# Patient Record
Sex: MALE | Race: WHITE | Employment: FULL TIME | URBAN - METROPOLITAN AREA
[De-identification: names, ages, dates, MRNs, and addresses within clinical notes are randomized per-mention and may not be internally consistent; named-entity substitution may affect disease eponyms.]

---

## 2018-09-12 NOTE — H&P
[address] [facility] [address] Gastroenterology                                                                                                  Clinic History and Physical     Pa History: Social History    Tobacco Use      Smoking status: Never Smoker      Smokeless tobacco: Never Used    Alcohol use: Yes    Drug use: No       Medications (Active prior to today's visit):    Current Outpatient Medications:  ALPRAZolam 0.5 MG Oral Ta are palpated  : no CVA tenderness  Skin: dry, warm, no jaundice  Ext: no cyanosis, clubbing or edema is evident.    Neuro: Alert and oriented x4, and patient is having movements of all 4 extremities   Psych: Pt has a normal mood and affect, behavior is no indicated. Orders This Visit:  No orders of the defined types were placed in this encounter.       Meds This Visit:  Requested Prescriptions     Signed Prescriptions Disp Refills   • PEG 3350-KCl-NaBcb-NaCl-NaSulf (COLYTE WITH FLAVOR PACKS) 240 g Oral

## 2018-09-19 ENCOUNTER — OFFICE VISIT (OUTPATIENT)
Dept: GASTROENTEROLOGY | Facility: CLINIC | Age: 53
End: 2018-09-19
Payer: COMMERCIAL

## 2018-09-19 ENCOUNTER — TELEPHONE (OUTPATIENT)
Dept: GASTROENTEROLOGY | Facility: CLINIC | Age: 53
End: 2018-09-19

## 2018-09-19 VITALS
WEIGHT: 219 LBS | SYSTOLIC BLOOD PRESSURE: 122 MMHG | BODY MASS INDEX: 26.95 KG/M2 | HEIGHT: 75.5 IN | DIASTOLIC BLOOD PRESSURE: 72 MMHG | HEART RATE: 89 BPM

## 2018-09-19 DIAGNOSIS — Z86.010 HX OF COLONIC POLYPS: Primary | ICD-10-CM

## 2018-09-19 DIAGNOSIS — Z86.010 HISTORY OF COLON POLYPS: Primary | ICD-10-CM

## 2018-09-19 DIAGNOSIS — Z86.010 HISTORY OF COLONIC POLYPS: Primary | ICD-10-CM

## 2018-09-19 PROCEDURE — 99243 OFF/OP CNSLTJ NEW/EST LOW 30: CPT | Performed by: NURSE PRACTITIONER

## 2018-09-19 PROCEDURE — 99212 OFFICE O/P EST SF 10 MIN: CPT | Performed by: NURSE PRACTITIONER

## 2018-09-19 RX ORDER — DEXTROAMPHETAMINE SACCHARATE, AMPHETAMINE ASPARTATE MONOHYDRATE, DEXTROAMPHETAMINE SULFATE AND AMPHETAMINE SULFATE 5; 5; 5; 5 MG/1; MG/1; MG/1; MG/1
20 CAPSULE, EXTENDED RELEASE ORAL
Refills: 0 | Status: ON HOLD | COMMUNITY
Start: 2018-07-06 | End: 2018-11-13

## 2018-09-19 RX ORDER — DEXTROAMPHETAMINE SACCHARATE, AMPHETAMINE ASPARTATE, DEXTROAMPHETAMINE SULFATE AND AMPHETAMINE SULFATE 2.5; 2.5; 2.5; 2.5 MG/1; MG/1; MG/1; MG/1
TABLET ORAL
Refills: 0 | COMMUNITY
Start: 2018-07-06

## 2018-09-19 RX ORDER — NICOTINE 14MG/24HR
PATCH, TRANSDERMAL 24 HOURS TRANSDERMAL
COMMUNITY

## 2018-09-19 RX ORDER — LISDEXAMFETAMINE DIMESYLATE 40 MG/1
40 CAPSULE ORAL
Refills: 0 | COMMUNITY
Start: 2018-08-31

## 2018-09-19 RX ORDER — ALPRAZOLAM 0.5 MG/1
TABLET, ORALLY DISINTEGRATING ORAL
Refills: 1 | COMMUNITY
Start: 2018-07-05

## 2018-09-19 NOTE — TELEPHONE ENCOUNTER
Spoke to pt and pt still wants to cancel procedure for  11/6/18    I canceled in [facility], sent a Surgical Case Change Request, forwarded to GI Schedulers.     HENOK Cha

## 2018-09-19 NOTE — TELEPHONE ENCOUNTER
Scheduled for:  Colonoscopy [id number]  Provider Name: Dr. Torres Bring  Date:  11/6/18  Location:  [facility]  Sedation:  IV  Time:  1756 (pt is aware to arrive at 1330)   Prep:  Colyte  Meds/Allergies Reconciled?:  Jennifer/APN reviewed   Diagnosis with codes:  History of colon

## 2018-09-19 NOTE — TELEPHONE ENCOUNTER
Scheduled for:  Colonoscopy - [id number]  Provider Name:  Dr. Shaila Nix  Date:  11/13/18  Location:  [facility]  Sedation:  IV  Time:  1:00 pm (pt is aware to arrive at 12:00 pm)  Prep:  Colyte, Prep instructions were given to pt over the phone, pt verbalized understanding.

## 2018-09-19 NOTE — PATIENT INSTRUCTIONS
1. Schedule colonoscopy with Dr. Shawanda Stratton w/ IV Twilight or MAC     2.  bowel prep from pharmacy - split dose Colyte    3. Continue all medications for procedure    4. Read all bowel prep instructions carefully    5.  AVOID seeds, nuts, popcorn,

## 2018-11-12 ENCOUNTER — TELEPHONE (OUTPATIENT)
Dept: GASTROENTEROLOGY | Facility: CLINIC | Age: 53
End: 2018-11-12

## 2018-11-12 NOTE — TELEPHONE ENCOUNTER
Spoke to patient went over bowel prep instructions : Your appointment is on: 11/13/18    You are scheduled to have your test done at :        [facility]; [facility]. [address] [place] [address]. [place] in [address] [place] [address] [employer] [facility]. Go to the 2nd floor endoscopy. TRANSPORTATION:  Make arrangements to have someone drive you home after the procedure. You cannot use public transportation, taxi cab, Uber/Lyft unless you have a friend/family member/caretaker accompanying you.   Colonoscopy involves sedation, and colonoscopy. You CANNOT have anything to drink 3 hours prior to the procedure. Your stomach needs to be empty.

## 2018-11-12 NOTE — TELEPHONE ENCOUNTER
Pt has CLN [facility] for tomorrow 11/13 and has lost his prep instructions, pls call at:[phone],thanks.

## 2018-11-13 ENCOUNTER — HOSPITAL ENCOUNTER (OUTPATIENT)
Facility: HOSPITAL | Age: 53
Setting detail: HOSPITAL OUTPATIENT SURGERY
Discharge: HOME | End: 2018-11-13
Attending: INTERNAL MEDICINE | Admitting: INTERNAL MEDICINE
Payer: COMMERCIAL

## 2018-11-13 VITALS
WEIGHT: 215 LBS | HEIGHT: 75 IN | OXYGEN SATURATION: 100 % | DIASTOLIC BLOOD PRESSURE: 77 MMHG | SYSTOLIC BLOOD PRESSURE: 121 MMHG | BODY MASS INDEX: 26.73 KG/M2 | HEART RATE: 75 BPM | RESPIRATION RATE: 14 BRPM

## 2018-11-13 DIAGNOSIS — Z86.010 HX OF COLONIC POLYPS: ICD-10-CM

## 2018-11-13 PROCEDURE — 45385 COLONOSCOPY W/LESION REMOVAL: CPT | Performed by: INTERNAL MEDICINE

## 2018-11-13 PROCEDURE — 0DBL8ZX EXCISION OF TRANSVERSE COLON, VIA NATURAL OR ARTIFICIAL OPENING ENDOSCOPIC, DIAGNOSTIC: ICD-10-PCS | Performed by: INTERNAL MEDICINE

## 2018-11-13 PROCEDURE — G0500 MOD SEDAT ENDO SERVICE >5YRS: HCPCS | Performed by: INTERNAL MEDICINE

## 2018-11-13 PROCEDURE — 0DBP8ZX EXCISION OF RECTUM, VIA NATURAL OR ARTIFICIAL OPENING ENDOSCOPIC, DIAGNOSTIC: ICD-10-PCS | Performed by: INTERNAL MEDICINE

## 2018-11-13 PROCEDURE — 0DBK8ZX EXCISION OF ASCENDING COLON, VIA NATURAL OR ARTIFICIAL OPENING ENDOSCOPIC, DIAGNOSTIC: ICD-10-PCS | Performed by: INTERNAL MEDICINE

## 2018-11-13 RX ORDER — SODIUM CHLORIDE, SODIUM LACTATE, POTASSIUM CHLORIDE, CALCIUM CHLORIDE 600; 310; 30; 20 MG/100ML; MG/100ML; MG/100ML; MG/100ML
INJECTION, SOLUTION INTRAVENOUS CONTINUOUS
Status: DISCONTINUED | OUTPATIENT
Start: 2018-11-13 | End: 2018-11-13

## 2018-11-13 RX ORDER — SODIUM CHLORIDE 0.9 % (FLUSH) 0.9 %
10 SYRINGE (ML) INJECTION AS NEEDED
Status: DISCONTINUED | OUTPATIENT
Start: 2018-11-13 | End: 2018-11-13

## 2018-11-13 RX ORDER — MIDAZOLAM HYDROCHLORIDE 1 MG/ML
1 INJECTION INTRAMUSCULAR; INTRAVENOUS EVERY 5 MIN PRN
Status: DISCONTINUED | OUTPATIENT
Start: 2018-11-13 | End: 2018-11-13

## 2018-11-13 RX ORDER — MIDAZOLAM HYDROCHLORIDE 1 MG/ML
INJECTION INTRAMUSCULAR; INTRAVENOUS
Status: DISCONTINUED | OUTPATIENT
Start: 2018-11-13 | End: 2018-11-13

## 2018-11-13 NOTE — OPERATIVE REPORT
[facility] Endoscopy Report      Date of Procedure:  11/13/18      Preoperative Diagnosis:  1. Personal history of adenomatous colon polyp  2. Colorectal cancer screening        Postoperative Diagnosis:  1. Colon polyps  2.   Minimal dive excised and retrieved via suction. The site was closed with a solitary endoscopic clip. Inspection of all the above sites revealed no evidence of ongoing bleeding.   There was a tiny diverticulum seen in the proximal transverse colon without signs of co

## 2018-11-13 NOTE — H&P
History & Physical Examination    Patient Name: Dwayne Peng  MRN: [id number]  [facility]: [id number]  YOB: 1965    Diagnosis: Personal history of adenomatous colon polyp, colorectal cancer screening        No medications prior to admission.

## 2018-11-16 ENCOUNTER — TELEPHONE (OUTPATIENT)
Dept: GASTROENTEROLOGY | Facility: CLINIC | Age: 53
End: 2018-11-16

## 2018-11-16 NOTE — TELEPHONE ENCOUNTER
----- Message from Theresa Cruz MD sent at 11/15/2018  7:44 PM CST -----  Up to [place]. He is feeling well. He had #5 subcentimeter polyps removed. #3 were adenomatous and the other 2 were not. I have discussed the significance.   Minimal diverticu

## 2021-03-10 ENCOUNTER — HOSPITAL ENCOUNTER (OUTPATIENT)
Dept: GENERAL RADIOLOGY | Age: 56
Discharge: HOME | End: 2021-03-10
Attending: FAMILY MEDICINE
Payer: COMMERCIAL

## 2021-03-10 DIAGNOSIS — R05.9 COUGH: ICD-10-CM

## 2021-03-10 PROCEDURE — 71046 X-RAY EXAM CHEST 2 VIEWS: CPT | Performed by: FAMILY MEDICINE

## 2021-04-08 ENCOUNTER — HOSPITAL ENCOUNTER (OUTPATIENT)
Dept: GENERAL RADIOLOGY | Facility: HOSPITAL | Age: 56
Discharge: HOME | End: 2021-04-08
Attending: SPECIALIST
Payer: COMMERCIAL

## 2021-04-08 DIAGNOSIS — R06.1 EXPIRATORY STRIDOR: ICD-10-CM

## 2021-04-08 PROCEDURE — 70360 X-RAY EXAM OF NECK: CPT | Performed by: SPECIALIST

## 2021-04-12 ENCOUNTER — LAB ENCOUNTER (OUTPATIENT)
Dept: LAB | Age: 56
End: 2021-04-12
Attending: SPECIALIST
Payer: COMMERCIAL

## 2021-04-12 ENCOUNTER — ORDER TRANSCRIPTION (OUTPATIENT)
Dept: ADMINISTRATIVE | Facility: HOSPITAL | Age: 56
End: 2021-04-12

## 2021-04-12 DIAGNOSIS — Z11.59 ENCOUNTER FOR SCREENING FOR OTHER VIRAL DISEASES: ICD-10-CM

## 2021-04-12 DIAGNOSIS — Z01.818 PREPROCEDURAL EXAMINATION: ICD-10-CM

## 2021-04-12 DIAGNOSIS — R06.1 STRIDOR: Primary | ICD-10-CM

## 2021-04-17 ENCOUNTER — LAB ENCOUNTER (OUTPATIENT)
Dept: LAB | Age: 56
End: 2021-04-17
Attending: SPECIALIST
Payer: COMMERCIAL

## 2021-04-17 DIAGNOSIS — Z11.59 ENCOUNTER FOR SCREENING FOR OTHER VIRAL DISEASES: ICD-10-CM

## 2021-04-17 DIAGNOSIS — Z01.818 PREOP TESTING: ICD-10-CM

## 2021-04-20 ENCOUNTER — HOSPITAL ENCOUNTER (OUTPATIENT)
Dept: RESPIRATORY THERAPY | Facility: HOSPITAL | Age: 56
Discharge: HOME | End: 2021-04-20
Attending: SPECIALIST
Payer: COMMERCIAL

## 2021-04-20 ENCOUNTER — TELEPHONE (OUTPATIENT)
Dept: RESPIRATORY THERAPY | Facility: HOSPITAL | Age: 56
End: 2021-04-20

## 2021-04-20 DIAGNOSIS — R06.1 STRIDOR: ICD-10-CM

## 2021-04-20 PROCEDURE — 94729 DIFFUSING CAPACITY: CPT

## 2021-04-20 PROCEDURE — 94726 PLETHYSMOGRAPHY LUNG VOLUMES: CPT

## 2021-04-20 PROCEDURE — 94060 EVALUATION OF WHEEZING: CPT

## 2021-08-26 ENCOUNTER — LAB ENCOUNTER (OUTPATIENT)
Dept: LAB | Age: 56
End: 2021-08-26
Attending: INTERNAL MEDICINE
Payer: COMMERCIAL

## 2021-08-26 DIAGNOSIS — R06.02 SOB (SHORTNESS OF BREATH): ICD-10-CM

## 2021-08-26 DIAGNOSIS — R05.9 COUGH: Primary | ICD-10-CM

## 2021-08-26 LAB — D DIMER PPP FEU-MCNC: 1.91 UG/ML FEU (ref ?–0.58)

## 2021-08-26 PROCEDURE — 85379 FIBRIN DEGRADATION QUANT: CPT

## 2021-08-26 PROCEDURE — 36415 COLL VENOUS BLD VENIPUNCTURE: CPT

## 2021-08-30 ENCOUNTER — HOSPITAL ENCOUNTER (OUTPATIENT)
Dept: CT IMAGING | Facility: HOSPITAL | Age: 56
Discharge: HOME | End: 2021-08-30
Attending: INTERNAL MEDICINE
Payer: COMMERCIAL

## 2021-08-30 DIAGNOSIS — R89.9 ELEVATED LABORATORY TEST RESULT: ICD-10-CM

## 2021-08-30 LAB — CREAT BLD-MCNC: 1 MG/DL

## 2021-08-30 PROCEDURE — 82565 ASSAY OF CREATININE: CPT

## 2021-08-30 PROCEDURE — 71260 CT THORAX DX C+: CPT | Performed by: INTERNAL MEDICINE

## 2022-09-04 ENCOUNTER — APPOINTMENT (OUTPATIENT)
Dept: GENERAL RADIOLOGY | Facility: HOSPITAL | Age: 57
End: 2022-09-04
Payer: COMMERCIAL

## 2022-09-04 ENCOUNTER — HOSPITAL ENCOUNTER (EMERGENCY)
Facility: HOSPITAL | Age: 57
Discharge: HOME | End: 2022-09-04
Payer: COMMERCIAL

## 2022-09-04 VITALS
RESPIRATION RATE: 20 BRPM | OXYGEN SATURATION: 100 % | DIASTOLIC BLOOD PRESSURE: 66 MMHG | SYSTOLIC BLOOD PRESSURE: 119 MMHG | HEART RATE: 90 BPM | WEIGHT: 215 LBS | BODY MASS INDEX: 26.73 KG/M2 | HEIGHT: 75 IN | TEMPERATURE: 98 F

## 2022-09-04 DIAGNOSIS — S09.8XXA BLUNT HEAD INJURY, INITIAL ENCOUNTER: ICD-10-CM

## 2022-09-04 DIAGNOSIS — S01.81XA FOREHEAD LACERATION, INITIAL ENCOUNTER: ICD-10-CM

## 2022-09-04 DIAGNOSIS — S46.912A LEFT SHOULDER STRAIN, INITIAL ENCOUNTER: Primary | ICD-10-CM

## 2022-09-04 PROCEDURE — 73030 X-RAY EXAM OF SHOULDER: CPT

## 2022-09-04 PROCEDURE — 12011 RPR F/E/E/N/L/M 2.5 CM/<: CPT

## 2022-09-04 PROCEDURE — 90471 IMMUNIZATION ADMIN: CPT

## 2022-09-04 PROCEDURE — 99283 EMERGENCY DEPT VISIT LOW MDM: CPT

## 2022-09-04 RX ORDER — HYDROCODONE BITARTRATE AND ACETAMINOPHEN 5; 325 MG/1; MG/1
1-2 TABLET ORAL EVERY 6 HOURS PRN
Qty: 16 TABLET | Refills: 0 | Status: SHIPPED | OUTPATIENT
Start: 2022-09-04

## 2022-09-04 RX ORDER — HYDROCODONE BITARTRATE AND ACETAMINOPHEN 5; 325 MG/1; MG/1
2 TABLET ORAL ONCE
Status: COMPLETED | OUTPATIENT
Start: 2022-09-04 | End: 2022-09-04

## 2022-09-04 RX ORDER — HYDROCODONE BITARTRATE AND ACETAMINOPHEN 5; 325 MG/1; MG/1
1-2 TABLET ORAL EVERY 6 HOURS PRN
Qty: 16 TABLET | Refills: 0 | Status: SHIPPED | OUTPATIENT
Start: 2022-09-04 | End: 2022-09-04
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
## 2022-10-10 ENCOUNTER — ORDER TRANSCRIPTION (OUTPATIENT)
Dept: PHYSICAL THERAPY | Facility: HOSPITAL | Age: 57
End: 2022-10-10

## 2022-10-10 DIAGNOSIS — M19.011 ARTHRITIS OF RIGHT SHOULDER REGION: Primary | ICD-10-CM

## 2022-10-10 DIAGNOSIS — S43.402D SPRAIN OF LEFT SHOULDER, SUBSEQUENT ENCOUNTER: ICD-10-CM

## 2022-10-17 ENCOUNTER — TELEPHONE (OUTPATIENT)
Dept: PHYSICAL THERAPY | Facility: HOSPITAL | Age: 57
End: 2022-10-17

## 2022-10-19 ENCOUNTER — OFFICE VISIT (OUTPATIENT)
Dept: PHYSICAL THERAPY | Age: 57
End: 2022-10-19
Attending: ORTHOPAEDIC SURGERY
Payer: COMMERCIAL

## 2022-10-19 DIAGNOSIS — M19.011 ARTHRITIS OF RIGHT SHOULDER REGION: ICD-10-CM

## 2022-10-19 DIAGNOSIS — S43.402D SPRAIN OF LEFT SHOULDER, SUBSEQUENT ENCOUNTER: ICD-10-CM

## 2022-10-19 PROCEDURE — 97530 THERAPEUTIC ACTIVITIES: CPT

## 2022-10-19 PROCEDURE — 97110 THERAPEUTIC EXERCISES: CPT

## 2022-10-19 PROCEDURE — 97162 PT EVAL MOD COMPLEX 30 MIN: CPT

## 2022-10-24 ENCOUNTER — APPOINTMENT (OUTPATIENT)
Dept: PHYSICAL THERAPY | Age: 57
End: 2022-10-24
Attending: ORTHOPAEDIC SURGERY
Payer: COMMERCIAL

## 2022-10-24 ENCOUNTER — TELEPHONE (OUTPATIENT)
Dept: PHYSICAL THERAPY | Age: 57
End: 2022-10-24

## 2022-10-26 ENCOUNTER — OFFICE VISIT (OUTPATIENT)
Facility: CLINIC | Age: 57
End: 2022-10-26
Payer: COMMERCIAL

## 2022-10-26 ENCOUNTER — TELEPHONE (OUTPATIENT)
Facility: CLINIC | Age: 57
End: 2022-10-26

## 2022-10-26 VITALS
BODY MASS INDEX: 27.35 KG/M2 | HEIGHT: 75 IN | DIASTOLIC BLOOD PRESSURE: 88 MMHG | SYSTOLIC BLOOD PRESSURE: 134 MMHG | HEART RATE: 71 BPM | WEIGHT: 220 LBS

## 2022-10-26 DIAGNOSIS — Z86.010 HISTORY OF COLON POLYPS: Primary | ICD-10-CM

## 2022-10-26 DIAGNOSIS — Z86.010 HISTORY OF COLON POLYPS: ICD-10-CM

## 2022-10-26 DIAGNOSIS — Z12.11 SCREEN FOR COLON CANCER: Primary | ICD-10-CM

## 2022-10-26 PROCEDURE — 3008F BODY MASS INDEX DOCD: CPT | Performed by: INTERNAL MEDICINE

## 2022-10-26 PROCEDURE — 99202 OFFICE O/P NEW SF 15 MIN: CPT | Performed by: INTERNAL MEDICINE

## 2022-10-26 PROCEDURE — 3075F SYST BP GE 130 - 139MM HG: CPT | Performed by: INTERNAL MEDICINE

## 2022-10-26 PROCEDURE — 3079F DIAST BP 80-89 MM HG: CPT | Performed by: INTERNAL MEDICINE

## 2022-10-26 RX ORDER — SODIUM PICOSULFATE, MAGNESIUM OXIDE, AND ANHYDROUS CITRIC ACID 10; 3.5; 12 MG/160ML; G/160ML; G/160ML
1 LIQUID ORAL ONCE
Qty: 1 EACH | Refills: 0 | Status: SHIPPED | OUTPATIENT
Start: 2022-10-26 | End: 2022-10-26

## 2022-10-26 NOTE — TELEPHONE ENCOUNTER
Scheduled for:  Colonoscopy [id number]  Provider Name:  Dr. Elvia Irvin  Date:  1/12/2023  Location:  [address] [place] [address] 1  Sedation:  MAC  Time:  11:15 am, arrival 10:15 am  Prep:  Clenpiq  Meds/Allergies Reconciled?:  Physician reviewed  Diagnosis with codes:  Screening for colon cancer Z12.11 & Hx of colon polyps Z86.010  Was patient informed to call insurance with codes (Y/N):  Yes  Referral sent?:  Referral was sent at the time of electronic surgical scheduling. [address] [place] [address] or [facility] Th  notified?:  I sent an electronic request to Endo Scheduling and received a confirmation today. Medication Orders:  N/A  Misc Orders:  N/A     Further instructions given by staff:  I provided patient with prep instruction sheet.

## 2022-10-26 NOTE — PATIENT INSTRUCTIONS
Schedule colonoscopy for colorectal cancer screening/a history of colon polyps following a split dose Clenpiq and monitored anesthesia care.

## 2022-10-31 ENCOUNTER — OFFICE VISIT (OUTPATIENT)
Dept: PHYSICAL THERAPY | Age: 57
End: 2022-10-31
Attending: ORTHOPAEDIC SURGERY
Payer: COMMERCIAL

## 2022-10-31 DIAGNOSIS — M19.011 ARTHRITIS OF RIGHT SHOULDER REGION: ICD-10-CM

## 2022-10-31 DIAGNOSIS — S43.402D SPRAIN OF LEFT SHOULDER, SUBSEQUENT ENCOUNTER: ICD-10-CM

## 2022-10-31 PROCEDURE — 97112 NEUROMUSCULAR REEDUCATION: CPT

## 2022-10-31 PROCEDURE — 97110 THERAPEUTIC EXERCISES: CPT

## 2022-10-31 NOTE — PROGRESS NOTES
Diagnosis:    Arthritis of right shoulder region (M19.011)  Sprain of left shoulder, subsequent encounter (O68.612A)        Referring Provider: Mati Ayala  Date of Evaluation:    10/19/22    Precautions:  None Next MD visit:   none scheduled  Date of Surgery: n/a   Insurance Primary/Secondary: [address] [place] [address] / N/A     Visit #: 2            Subjective: Pt reports that his shoulder continues to feel better, but that he continues to have intermittent jolting pain (7/10). Pt reports his back is gradually getting better. LBP after sitting > 60 min 5-6/10. Pain: 0/10 shoulder, 1-2/10 LBP. Objective: Reviewed HEP issued at [facility]: Scapular retraction, wall pushups, AROM in painfree range. Discomfort noted with ER (reaching behind head), IR (reaching behind back), across body to opposite shoulder. See table for program.      Assessment: good effort with all exercises. Pt reported feeling fatigue in shoulder and some relief of back tightness after session. Goals:   (to be met in 12 visits)   - Pt indep with HEP 5 of 7 days.  - Pt with full AROM L shoulder to allow for donning jacket. - Pt with 5/5 LUE strength to allow pt to react quickly/reach for coffee cup.  - Pt tolerates sleeping L side off and on throughout the night as needed. - Pt with full lumbar ROM to allow for donning socks and shoes without c/o. - Pt with 5/5 core and LE strength to allow for working in garage as needed. - Pt demonstrates good body mechanics with lifting for safe return to garage hobby/work. Plan: Advance shoulder and core stabilization  Date: 10/31/2022  TX#: 2 Date:                 TX#: 3/ Date:                 TX#: 4/ Date:                 TX#: 5/ Date: Tx#: 6/   NM:  - Scapular retraction 3 sec x 10 reps. -Wall push ups x 2/10.  -UE4 pt  wt shift in all planes x 10.  - GTB: Ext, add, ER, IR, flex, horiz abd.  - PPT 3 sec hold x 10  -Kneeling plank 3 sec hold x 10 reps. -NUSTEP seat 12, level 7 x 5 min.   - chair squats x       TE:  - AROM L shoulder in painfree range. -PPU x 10 reps  -DKTC x 10 reps                     HEP: GTB Flex, Ext, ER, IR, horiz abd, PPT, kneeling plank to HEP. Written program issued.     Charges: TE, NM2       Total Timed Treatment: 45 min  Total Treatment Time: 45 min

## 2022-11-02 ENCOUNTER — OFFICE VISIT (OUTPATIENT)
Dept: PHYSICAL THERAPY | Age: 57
End: 2022-11-02
Attending: ORTHOPAEDIC SURGERY
Payer: COMMERCIAL

## 2022-11-02 DIAGNOSIS — M19.011 ARTHRITIS OF RIGHT SHOULDER REGION: ICD-10-CM

## 2022-11-02 DIAGNOSIS — S43.402D SPRAIN OF LEFT SHOULDER, SUBSEQUENT ENCOUNTER: ICD-10-CM

## 2022-11-02 PROCEDURE — 97112 NEUROMUSCULAR REEDUCATION: CPT

## 2022-11-02 PROCEDURE — 97110 THERAPEUTIC EXERCISES: CPT

## 2022-11-02 NOTE — PROGRESS NOTES
Diagnosis:    Arthritis of right shoulder region (M19.011)  Sprain of left shoulder, subsequent encounter (E69.830U)        Referring Provider: Shruthi Kwon  Date of Evaluation:    10/19/22    Precautions:  None Next MD visit:   none scheduled  Date of Surgery: n/a   Insurance Primary/Secondary: [facility] / N/A     Visit #: 3            Subjective: Pt reports feeling ok after last session. \"I'm getting better every day. Shoulder intermittently barks at me, but not worse. Back is painful when getting out of car our up from desk. \" per pt  Pain: 0/10 shoulder, 1-2/10 LBP. Objective:   Reviewed HEP issued at last visit: GTB Flex, Ext, ER, IR, horiz abd, PPT, kneeling plank     See table for program.      Assessment: good effort with all exercises. Pt reported fatigue, but tolerated all increased reps and sets without c/o. Goals:   (to be met in 12 visits)   - Pt indep with HEP 5 of 7 days.  - Pt with full AROM L shoulder to allow for donning jacket. - Pt with 5/5 LUE strength to allow pt to react quickly/reach for coffee cup.  - Pt tolerates sleeping L side off and on throughout the night as needed. - Pt with full lumbar ROM to allow for donning socks and shoes without c/o. - Pt with 5/5 core and LE strength to allow for working in garage as needed. - Pt demonstrates good body mechanics with lifting for safe return to garage hobby/work. Plan: Advance shoulder and core stabilization  Date: 10/31/2022  TX#: 2 Date: 11/2/22                TX#: 3 Date:                 TX#: 4/ Date:                 TX#: 5/ Date: Tx#: 6/   NM:  - Scapular retraction 3 sec x 10 reps. -Wall push ups x 2/10.  -UE4 pt  wt shift in all planes x 10.  - GTB: Ext, add, ER, IR, flex, horiz abd.  - PPT 3 sec hold x 10  -Kneeling plank 3 sec hold x 10 reps. -NUSTEP seat 12, level 7 x 5 min. - chair squats x NM:  -Wall push ups x 2/10.  -UE4 pt  wt shift in all planes x 10.  - GTB: Ext, add, ER, IR, flex, horiz abd.  X 2/10-15  - PPT 3 sec hold x 10  -Kneeling plank 3 sec hold x 15 reps. -NUSTEP seat 12, level 7 x 5 min. - chair squats x 10  -SB PWO 1/3 x 10  -Lunges with UE func mov't S pl x 10 B  4 pt on inverted BOSU S and F pl taps x 10 each   TE:  -PPU x 10 reps  -DKTC x 5 reps         TE:  - AROM L shoulder in painfree range. -PPU x 10 reps  -DKTC x 10 reps                     HEP:  No change to HEP this visit. 10/31/22:GTB Flex, Ext, ER, IR, horiz abd, PPT, kneeling plank to HEP. Written program issued.     Charges: TE, NM2       Total Timed Treatment: 45 min  Total Treatment Time: 45 min

## 2022-11-07 ENCOUNTER — APPOINTMENT (OUTPATIENT)
Dept: PHYSICAL THERAPY | Age: 57
End: 2022-11-07
Attending: ORTHOPAEDIC SURGERY
Payer: COMMERCIAL

## 2022-11-07 ENCOUNTER — TELEPHONE (OUTPATIENT)
Dept: PHYSICAL THERAPY | Age: 57
End: 2022-11-07

## 2022-11-09 ENCOUNTER — OFFICE VISIT (OUTPATIENT)
Dept: PHYSICAL THERAPY | Age: 57
End: 2022-11-09
Attending: ORTHOPAEDIC SURGERY
Payer: COMMERCIAL

## 2022-11-09 DIAGNOSIS — M19.011 ARTHRITIS OF RIGHT SHOULDER REGION: ICD-10-CM

## 2022-11-09 DIAGNOSIS — S43.402D SPRAIN OF LEFT SHOULDER, SUBSEQUENT ENCOUNTER: ICD-10-CM

## 2022-11-09 PROCEDURE — 97110 THERAPEUTIC EXERCISES: CPT

## 2022-11-09 PROCEDURE — 97112 NEUROMUSCULAR REEDUCATION: CPT

## 2022-11-09 NOTE — PROGRESS NOTES
Diagnosis:    Arthritis of right shoulder region (M19.011)  Sprain of left shoulder, subsequent encounter (G02.641V)        Referring Provider: Filippo Walsh  Date of Evaluation:    10/19/22    Precautions:  None Next MD visit:   none scheduled  Date of Surgery: n/a   Insurance Primary/Secondary: [address] [place] [address] / N/A     Visit #: 4          Subjective: Pt reports slow improvements in shoulder. 0/10 pain currently, 2-3/10 pain with sudden mov't. Pt reports LBP is improving, only slight left sided pain after sitting for extended periods. Pain: 0/10 shoulder, 0/10 LBP currently. Objective:     See table for program.      Assessment: good effort with all exercises. Pt tolerating increased reps/sets. Visible fatigue with core and shoulder strengthening. Goals:   (to be met in 12 visits)   - Pt indep with HEP 5 of 7 days.  - Pt with full AROM L shoulder to allow for donning jacket. - Pt with 5/5 LUE strength to allow pt to react quickly/reach for coffee cup.  - Pt tolerates sleeping L side off and on throughout the night as needed. - Pt with full lumbar ROM to allow for donning socks and shoes without c/o. - Pt with 5/5 core and LE strength to allow for working in garage as needed. - Pt demonstrates good body mechanics with lifting for safe return to garage hobby/work. Plan: Advance shoulder and core stabilization  Date: 10/31/2022  TX#: 2 Date: 11/2/22                TX#: 3 Date:   11/9/22              TX#: 4 Date:                 TX#: 5/ Date: Tx#: 6/   NM:  - Scapular retraction 3 sec x 10 reps. -Wall push ups x 2/10.  -UE4 pt  wt shift in all planes x 10.  - GTB: Ext, add, ER, IR, flex, horiz abd.  - PPT 3 sec hold x 10  -Kneeling plank 3 sec hold x 10 reps. -NUSTEP seat 12, level 7 x 5 min. - chair squats x NM:  -Wall push ups x 2/10.  -UE4 pt  wt shift in all planes x 10.  - GTB: Ext, add, ER, IR, flex, horiz abd. X 2/10-15  - PPT 3 sec hold x 10  -Kneeling plank 3 sec hold x 15 reps.   -Jp Pile seat 12, level 7 x 5 min. - chair squats x 10  -SB PWO 1/3 x 10  -Lunges with UE func mov't S pl x 10 B  4 pt on inverted BOSU S and F pl taps x 10 each   TE:  -PPU x 10 reps  -DKTC x 5 reps    NM:  -Wall push ups (counter top/window sill) x 2/10.  -UE4 pt  wt shift in all planes x 10.  - BTB: Ext, add, ER, IR, flex, horiz abd. X 2/10  - PPT 3 sec hold x 10  -Kneeling plank 3 sec hold x 2/10 reps. -NUSTEP seat 12, level 7 x 6 min. - chair squats x 2/10  -SB PWO 1/2 x 10  PWO x 10  -Lunges with UE func mov't S and F pl x 10 B  4 pt on inverted BOSU S and F pl taps x 10 each   TE:  -PPU x 10 reps  -DKTC x 5 reps        TE:  - AROM L shoulder in painfree range. -PPU x 10 reps  -DKTC x 10 reps                     HEP: Blue Tband issued for HEP.       Charges: TE, NM2       Total Timed Treatment: 45 min  Total Treatment Time: 45 min

## 2022-11-16 ENCOUNTER — APPOINTMENT (OUTPATIENT)
Dept: PHYSICAL THERAPY | Age: 57
End: 2022-11-16
Payer: COMMERCIAL

## 2022-11-23 ENCOUNTER — OFFICE VISIT (OUTPATIENT)
Dept: PHYSICAL THERAPY | Age: 57
End: 2022-11-23
Payer: COMMERCIAL

## 2022-11-23 PROCEDURE — 97140 MANUAL THERAPY 1/> REGIONS: CPT

## 2022-11-23 PROCEDURE — 97112 NEUROMUSCULAR REEDUCATION: CPT

## 2022-11-23 NOTE — PROGRESS NOTES
Diagnosis:    Arthritis of right shoulder region (M19.011)  Sprain of left shoulder, subsequent encounter (J13.800J)        Referring Provider: No ref. provider found  Date of Evaluation:    10/19/22    Precautions:  None Next MD visit:   none scheduled  Date of Surgery: n/a   Insurance Primary/Secondary: [facility] / N/A     Visit #:5          Subjective: Pt reports that his back feels good. Left shoulder is improving- \"fewer and fewer sharp spikes. \"    Pain: 0-4/10 shoulder, 0/10 LBP currently. Objective:     See table for program.      Assessment: good effort with all exercises. Pt tolerating increased reps/sets. Visible fatigue with core and shoulder strengthening. Goals:   (to be met in 12 visits)   - Pt indep with HEP 5 of 7 days. - met  - Pt with full AROM L shoulder to allow for donning jacket. - met  - Pt with 5/5 LUE strength to allow pt to react quickly/reach for coffee cup.- unmet  - Pt tolerates sleeping L side off and on throughout the night as needed. - unmet but improving  - Pt with full lumbar ROM to allow for donning socks and shoes without c/o.- met  - Pt with 5/5 core and LE strength to allow for working in garage as needed.-unmet, but to attempt this afternoon  - Pt demonstrates good body mechanics with lifting for safe return to garage hobby/work. - met    Plan: Advance shoulder and core stabilization  Date: 10/31/2022  TX#: 2 Date: 11/2/22                TX#: 3 Date:   11/9/22              TX#: 4 Date: 11/23/22                TX#: 5 Date: Tx#: 6/   NM:  - Scapular retraction 3 sec x 10 reps. -Wall push ups x 2/10.  -UE4 pt  wt shift in all planes x 10.  - GTB: Ext, add, ER, IR, flex, horiz abd.  - PPT 3 sec hold x 10  -Kneeling plank 3 sec hold x 10 reps. -NUSTEP seat 12, level 7 x 5 min. - chair squats x NM:  -Wall push ups x 2/10.  -UE4 pt  wt shift in all planes x 10.  - GTB: Ext, add, ER, IR, flex, horiz abd.  X 2/10-15  - PPT 3 sec hold x 10  -Kneeling plank 3 sec hold x 15 reps.  -NUSTEP seat 12, level 7 x 5 min. - chair squats x 10  -SB PWO 1/3 x 10  -Lunges with UE func mov't S pl x 10 B  4 pt on inverted BOSU S and F pl taps x 10 each   TE:  -PPU x 10 reps  -DKTC x 5 reps    NM:  -Wall push ups (counter top/window sill) x 2/10.  -UE4 pt  wt shift in all planes x 10.  - BTB: Ext, add, ER, IR, flex, horiz abd. X 2/10  - PPT 3 sec hold x 10  -Kneeling plank 3 sec hold x 2/10 reps. -NUSTEP seat 12, level 7 x 6 min. - chair squats x 2/10  -SB PWO 1/2 x 10  PWO x 10  -Lunges with UE func mov't S and F pl x 10 B  4 pt on inverted BOSU S and F pl taps x 10 each   TE:  -PPU x 10 reps  -DKTC x 5 reps    NM:  -Wall push ups (counter top/window sill) x 2/10.  -2  lb punches overhead, across at shoulder ht, pull away, pull across x 10 each B.  -NUSTEP seat 12, level 7 x 6 min.    -SB PWO x 10  -Lunges with 2 lb UE func mov't S and F pl x 10 B  4 pt on inverted BOSU S and F pl taps x 2/15 each   MM:  Tennis ball and roller TPR infraspinatus, teres minor, lats. TE:  - AROM L shoulder in painfree range. -PPU x 10 reps  -DKTC x 10 reps                     HEP: Tennis ball TPR HEP.       Charges: NM2, MM       Total Timed Treatment: 45 min  Total Treatment Time: 45 min

## 2022-11-28 ENCOUNTER — OFFICE VISIT (OUTPATIENT)
Dept: PHYSICAL THERAPY | Age: 57
End: 2022-11-28
Payer: COMMERCIAL

## 2022-11-28 PROCEDURE — 97112 NEUROMUSCULAR REEDUCATION: CPT

## 2022-11-28 PROCEDURE — 97110 THERAPEUTIC EXERCISES: CPT

## 2022-11-28 NOTE — PROGRESS NOTES
Diagnosis:    Arthritis of right shoulder region (M19.011)  Sprain of left shoulder, subsequent encounter (W23.023B)        Referring Provider: No ref. provider found  Date of Evaluation:    10/19/22    Precautions:  None Next MD visit:   none scheduled  Date of Surgery: n/a   Insurance Primary/Secondary: [facility] / N/A     Visit #:6          Subjective: Pt reports he is very tired today after working in garage for 14 hours yesterday. \"I just feel like I worked out really hard yesterday. \" per pt \"Tennis ball seems to be helping. Pain in shoulder still spikes to a 4/10, but less often Tweaked my back yesterday lifting a few things I shouldn't have. \" per pt    Pain: 0-4/10 shoulder, 2/10 LBP currently. Objective:     See table for program.      Assessment: good effort with all exercises. Pt tolerating increased reps/sets. Visible fatigue with core and shoulder strengthening. Goals:   (to be met in 12 visits)   - Pt indep with HEP 5 of 7 days. - met  - Pt with full AROM L shoulder to allow for donning jacket. - met  - Pt with 5/5 LUE strength to allow pt to react quickly/reach for coffee cup.- unmet  - Pt tolerates sleeping L side off and on throughout the night as needed. - unmet but improving  - Pt with full lumbar ROM to allow for donning socks and shoes without c/o.- met  - Pt with 5/5 core and LE strength to allow for working in garage as needed.-unmet, but to attempt this afternoon  - Pt demonstrates good body mechanics with lifting for safe return to garage hobby/work. - met    Plan: Advance shoulder and core stabilization  Date: 10/31/2022  TX#: 2 Date: 11/2/22                TX#: 3 Date:   11/9/22              TX#: 4 Date: 11/23/22                TX#: 5 Date: 11/28/22  Tx#: 6   NM:  - Scapular retraction 3 sec x 10 reps. -Wall push ups x 2/10.  -UE4 pt  wt shift in all planes x 10.  - GTB: Ext, add, ER, IR, flex, horiz abd.  - PPT 3 sec hold x 10  -Kneeling plank 3 sec hold x 10 reps.   -NUSTEP seat 12, level 7 x 5 min. - chair squats x NM:  -Wall push ups x 2/10.  -UE4 pt  wt shift in all planes x 10.  - GTB: Ext, add, ER, IR, flex, horiz abd. X 2/10-15  - PPT 3 sec hold x 10  -Kneeling plank 3 sec hold x 15 reps. -NUSTEP seat 12, level 7 x 5 min. - chair squats x 10  -SB PWO 1/3 x 10  -Lunges with UE func mov't S pl x 10 B  4 pt on inverted BOSU S and F pl taps x 10 each   TE:  -PPU x 10 reps  -DKTC x 5 reps    NM:  -Wall push ups (counter top/window sill) x 2/10.  -UE4 pt  wt shift in all planes x 10.  - BTB: Ext, add, ER, IR, flex, horiz abd. X 2/10  - PPT 3 sec hold x 10  -Kneeling plank 3 sec hold x 2/10 reps. -NUSTEP seat 12, level 7 x 6 min. - chair squats x 2/10  -SB PWO 1/2 x 10  PWO x 10  -Lunges with UE func mov't S and F pl x 10 B  4 pt on inverted BOSU S and F pl taps x 10 each   TE:  -PPU x 10 reps  -DKTC x 5 reps    NM:  -Wall push ups (counter top/window sill) x 2/10.  -2  lb punches overhead, across at shoulder ht, pull away, pull across x 10 each B.  -NUSTEP seat 12, level 7 x 6 min.    -SB PWO x 10  -Lunges with 2 lb UE func mov't S and F pl x 10 B  4 pt on inverted BOSU S and F pl taps x 2/15 each   MM:  Tennis ball and roller TPR infraspinatus, teres minor, lats. NM:  -Wall push ups (counter top/window sill) x 3/10.  -2  lb punches overhead, across at shoulder ht, pull away, pull across x 10 each B.  -NUSTEP seat 12, level 8 x 6 min.  -SB PWO x 10  -Lunges with 2 lb UE func mov't S and F pl x 10 B  4 pt on inverted BOSU S and F pl taps x20, x 10 each   TE:  Prayer stretch 15 sec x 3  Seated FB stretch 15 sec x 3  PPU x 10      TE:  - AROM L shoulder in painfree range. -PPU x 10 reps  -DKTC x 10 reps                     HEP: No change to HEP.       Charges: NM2, TE      Total Timed Treatment: 38 min  Total Treatment Time: 38 min

## 2022-12-07 ENCOUNTER — APPOINTMENT (OUTPATIENT)
Dept: PHYSICAL THERAPY | Age: 57
End: 2022-12-07
Attending: FAMILY MEDICINE
Payer: COMMERCIAL

## 2022-12-12 ENCOUNTER — APPOINTMENT (OUTPATIENT)
Dept: PHYSICAL THERAPY | Age: 57
End: 2022-12-12
Attending: FAMILY MEDICINE
Payer: COMMERCIAL

## 2022-12-19 ENCOUNTER — APPOINTMENT (OUTPATIENT)
Dept: PHYSICAL THERAPY | Age: 57
End: 2022-12-19
Attending: FAMILY MEDICINE
Payer: COMMERCIAL

## 2022-12-19 ENCOUNTER — TELEPHONE (OUTPATIENT)
Dept: PHYSICAL THERAPY | Facility: HOSPITAL | Age: 57
End: 2022-12-19

## 2023-01-07 NOTE — PAT NURSING NOTE
Pt.reported having a positive Covid test on 12/13 and had symptoms of cough, chills, body aches, fever and loss of taste and smell. Per anesthesia protocol, procedure needs to be delayed 4 weeks post Covid with respiratory symptoms. Okay to proceed as 4 weeks fall on 1/10.

## 2023-01-12 ENCOUNTER — HOSPITAL ENCOUNTER (OUTPATIENT)
Facility: HOSPITAL | Age: 58
Setting detail: HOSPITAL OUTPATIENT SURGERY
Discharge: HOME | End: 2023-01-12
Attending: INTERNAL MEDICINE | Admitting: INTERNAL MEDICINE
Payer: COMMERCIAL

## 2023-01-12 ENCOUNTER — ANESTHESIA (OUTPATIENT)
Dept: ENDOSCOPY | Facility: HOSPITAL | Age: 58
End: 2023-01-12
Payer: COMMERCIAL

## 2023-01-12 ENCOUNTER — ANESTHESIA EVENT (OUTPATIENT)
Dept: ENDOSCOPY | Facility: HOSPITAL | Age: 58
End: 2023-01-12
Payer: COMMERCIAL

## 2023-01-12 VITALS
TEMPERATURE: 98 F | OXYGEN SATURATION: 100 % | HEART RATE: 66 BPM | SYSTOLIC BLOOD PRESSURE: 103 MMHG | HEIGHT: 74 IN | BODY MASS INDEX: 27.98 KG/M2 | WEIGHT: 218 LBS | RESPIRATION RATE: 11 BRPM | DIASTOLIC BLOOD PRESSURE: 77 MMHG

## 2023-01-12 DIAGNOSIS — Z12.11 SCREEN FOR COLON CANCER: ICD-10-CM

## 2023-01-12 DIAGNOSIS — Z86.010 HISTORY OF COLON POLYPS: ICD-10-CM

## 2023-01-12 PROCEDURE — 0DBM8ZX EXCISION OF DESCENDING COLON, VIA NATURAL OR ARTIFICIAL OPENING ENDOSCOPIC, DIAGNOSTIC: ICD-10-PCS | Performed by: INTERNAL MEDICINE

## 2023-01-12 PROCEDURE — 45385 COLONOSCOPY W/LESION REMOVAL: CPT | Performed by: INTERNAL MEDICINE

## 2023-01-12 PROCEDURE — 0DBL8ZX EXCISION OF TRANSVERSE COLON, VIA NATURAL OR ARTIFICIAL OPENING ENDOSCOPIC, DIAGNOSTIC: ICD-10-PCS | Performed by: INTERNAL MEDICINE

## 2023-01-12 RX ORDER — SODIUM CHLORIDE, SODIUM LACTATE, POTASSIUM CHLORIDE, CALCIUM CHLORIDE 600; 310; 30; 20 MG/100ML; MG/100ML; MG/100ML; MG/100ML
INJECTION, SOLUTION INTRAVENOUS CONTINUOUS
Status: DISCONTINUED | OUTPATIENT
Start: 2023-01-12 | End: 2023-01-12

## 2023-01-12 RX ORDER — LIDOCAINE HYDROCHLORIDE 10 MG/ML
INJECTION, SOLUTION EPIDURAL; INFILTRATION; INTRACAUDAL; PERINEURAL AS NEEDED
Status: DISCONTINUED | OUTPATIENT
Start: 2023-01-12 | End: 2023-01-12 | Stop reason: SURG

## 2023-01-12 RX ADMIN — SODIUM CHLORIDE, SODIUM LACTATE, POTASSIUM CHLORIDE, CALCIUM CHLORIDE: 600; 310; 30; 20 INJECTION, SOLUTION INTRAVENOUS at 11:20:00

## 2023-01-12 RX ADMIN — SODIUM CHLORIDE, SODIUM LACTATE, POTASSIUM CHLORIDE, CALCIUM CHLORIDE: 600; 310; 30; 20 INJECTION, SOLUTION INTRAVENOUS at 12:12:00

## 2023-01-12 RX ADMIN — LIDOCAINE HYDROCHLORIDE 40 MG: 10 INJECTION, SOLUTION EPIDURAL; INFILTRATION; INTRACAUDAL; PERINEURAL at 11:23:00

## 2023-01-12 NOTE — OPERATIVE REPORT
[facility] Endoscopy Report      Date of Procedure:  01/12/23      Preoperative Diagnosis:  1. Colorectal cancer screening  2. Personal history of adenomatous colon polyps      Postoperative Diagnosis:  Colon polyps      Procedure:    Colonoscopy with polypectomy      Surgeon:  Dwight Bravo M.D. Anesthesia:  Monitored anesthesia care  Cecal withdrawal time: 32 minutes  EBL:  Insignificant      Brief History: This is a 59year old male who presents for a screening/surveillance colonoscopy in the setting of a history of #3 subcentimeter tubular adenomas removed in the colon a little over 3 years prior. The patient has been asymptomatic from a lower gastrointestinal tract standpoint. Technique:  After informed consent, the patient was placed in the left lateral recumbent position. Digital rectal examination revealed no palpable intraluminal abnormalities. An Olympus variable stiffness 190 series HD colonoscope was inserted into the rectum and advanced under direct vision by following the lumen through somewhat of a long and tortuous colon to the terminal ileum. The colon was examined upon withdrawal in the left lateral recumbent position. Findings:  The preparation of the colon was initially fair (Clenpiq). There were large pools of opaque bile-stained fluid and bile-stained material smearing the walls. Over 2.5 L of fluid was irrigated and suctioned. Adequate visualization was achieved. The terminal ileum was examined for several cm and visually normal.  The ileocecal valve was well preserved. The visualized colonic mucosa from the cecum to the anal verge was normal with an intact vascular pattern. There were #2 3-5 mm sessile polyps in the proximal descending and distal transverse colon which were cold snare excised and retrieved. No ongoing bleeding.   There were no other colonic polyps, definite diverticula, mass lesions, vascular anomalies or signs of inflammation seen.  Retroflexion in the rectum revealed incidental internal hemorrhoids. The procedure was well tolerated without immediate complication. Impression:  1. Diminutive/small colon polyps  2. Otherwise normal colonoscopy to the terminal ileum    Recommendations:  1. Follow-up biopsy results. 2.  I would opt to proceed with a surveillance colonoscopy in 5 years regardless of histology in light of the long tortuous colon and colonoscopic preparation. The examination should be performed sooner if there are any new symptoms or signs referable to the colon.         Frankie Ku MD  1/12/2023

## 2023-01-12 NOTE — DISCHARGE INSTRUCTIONS

## 2023-01-16 ENCOUNTER — MED REC SCAN ONLY (OUTPATIENT)
Facility: CLINIC | Age: 58
End: 2023-01-16

## 2023-01-16 ENCOUNTER — TELEPHONE (OUTPATIENT)
Facility: CLINIC | Age: 58
End: 2023-01-16

## 2023-01-16 NOTE — TELEPHONE ENCOUNTER
Health maintenance updated.      Last colonoscopy done on 1/12/2023 by Dr. Mikel Garcia placed into Pt Outreach, next due on 1/2028 per Dr. Vince Hitchcock

## 2023-11-17 ENCOUNTER — HOSPITAL ENCOUNTER (OUTPATIENT)
Dept: CT IMAGING | Age: 58
Discharge: HOME | End: 2023-11-17
Attending: FAMILY MEDICINE
Payer: COMMERCIAL

## 2023-11-17 DIAGNOSIS — R91.8 LUNG MASS: ICD-10-CM

## 2023-11-17 LAB
CREAT BLD-MCNC: 0.9 MG/DL
EGFRCR SERPLBLD CKD-EPI 2021: 98 ML/MIN/1.73M2 (ref 60–?)

## 2023-11-17 PROCEDURE — 82565 ASSAY OF CREATININE: CPT

## 2023-11-17 PROCEDURE — 71260 CT THORAX DX C+: CPT | Performed by: FAMILY MEDICINE

## 2023-11-21 ENCOUNTER — ORDER TRANSCRIPTION (OUTPATIENT)
Dept: ADMINISTRATIVE | Facility: HOSPITAL | Age: 58
End: 2023-11-21

## 2023-11-21 DIAGNOSIS — Z13.6 SCREENING FOR CARDIOVASCULAR CONDITION: Primary | ICD-10-CM

## 2024-01-26 ENCOUNTER — HOSPITAL ENCOUNTER (OUTPATIENT)
Dept: CT IMAGING | Age: 59
Discharge: HOME | End: 2024-01-26
Attending: FAMILY MEDICINE

## 2024-01-26 DIAGNOSIS — Z13.6 SCREENING FOR CARDIOVASCULAR CONDITION: ICD-10-CM

## 2025-02-26 ENCOUNTER — HOSPITAL ENCOUNTER (OUTPATIENT)
Age: 60
Discharge: HOME | End: 2025-02-26
Payer: COMMERCIAL

## 2025-02-26 ENCOUNTER — APPOINTMENT (OUTPATIENT)
Dept: GENERAL RADIOLOGY | Age: 60
End: 2025-02-26
Attending: NURSE PRACTITIONER
Payer: COMMERCIAL

## 2025-02-26 VITALS
RESPIRATION RATE: 18 BRPM | OXYGEN SATURATION: 99 % | DIASTOLIC BLOOD PRESSURE: 75 MMHG | HEART RATE: 91 BPM | TEMPERATURE: 98 F | SYSTOLIC BLOOD PRESSURE: 107 MMHG

## 2025-02-26 DIAGNOSIS — M25.531 RIGHT WRIST PAIN: Primary | ICD-10-CM

## 2025-02-26 PROCEDURE — 99213 OFFICE O/P EST LOW 20 MIN: CPT

## 2025-02-26 PROCEDURE — 73110 X-RAY EXAM OF WRIST: CPT | Performed by: NURSE PRACTITIONER

## 2025-02-26 PROCEDURE — 99214 OFFICE O/P EST MOD 30 MIN: CPT

## 2025-02-26 NOTE — ED PROVIDER NOTES
Patient Seen in: [facility]    History     Chief Complaint   Patient presents with    Arm or Hand Injury     Stated Complaint: right wrist injury    HPI    HPI: Parker Grey is a 59 year old male who presents with chief complaint of right wrist pain.  Onset 2 weeks ago.  Patient states that he sustained a hyper flexion injury of the right wrist while playing pickle ball.  Patient reports weakness of right hand when giving a handshake.  Patient denies other injury, head/neck injury or pain, open wound, decreased range of motion, swelling, ecchymosis, erythema, paraesthesias.      Past Medical History:    Asthma (HCC)    Attention deficit disorder    Colon polyp    Gastric ulcer       Past Surgical History:   Procedure Laterality Date    Colonoscopy  2013    Dr Peng    Colonoscopy N/A 11/13/2018    Procedure: COLONOSCOPY;  Surgeon: Geovanni Black MD;  Location: [facility] ENDOSCOPY    Colonoscopy N/A 1/12/2023    Procedure: COLONOSCOPY;  Surgeon: Geovanni Black MD;  Location: [facility] ENDOSCOPY    Egd              No family history on file.    Social History     Socioeconomic History    Marital status:    Tobacco Use    Smoking status: Never    Smokeless tobacco: Never   Vaping Use    Vaping status: Never Used   Substance and Sexual Activity    Alcohol use: Yes     Comment: 4-5 drinks weekly     Drug use: No       Review of Systems    Positive for stated complaint: right wrist injury  Other systems are as noted in HPI.  Constitutional and vital signs reviewed.      All other systems reviewed and negative except as noted above.    PSFH elements reviewed from today and agreed except as otherwise stated in HPI.    Physical Exam     ED Triage Vitals [02/26/25 1720]   /75   Pulse 91   Resp 18   Temp 98 °F (36.7 °C)   Temp src Oral   SpO2 99 %   O2 Device None (Room air)       Current:/75   Pulse 91   Temp 98 °F (36.7 °C) (Oral)   Resp 18   SpO2 99%     PULSE OX within  normal limits on room air as interpreted by this provider.    Physical Exam      Constitutional: The patient is cooperative. Appears well-developed and well-nourished.  Mild discomfort.  Psychological: Alert, No abnormalities of mood, affect.  Head: Normocephalic/atraumatic.  Eyes: Pupils are equal round reactive to light.  Conjunctiva are within normal limits.  ENT: Oropharynx is clear.  Neck: The neck is supple.  No meningeal signs.  Respiratory: Respiratory effort was normal.  There is no stridor.  Air entry is equal.  Cardiovascular: Regular rate and rhythm.  Capillary refill is brisk.   Genitourinary: Not examined.  Lymphatic: No gross lymphadenopathy noted.  Musculoskeletal: Right upper extremity-Right upper extremity normal to inspection without acute bony deformity.  Positive tenderness to palpation present at radial aspect of right wrist.  No anatomic snuffbox tenderness.  Remainder of right upper extremity nontender to palpation.  Full range of motion of right wrist with reported pain.  Distal pulses intact.  Capillary refill normal.  Motor intact distally.  Sensory intact distally.  No ecchymosis.  No erythema.  No open wounds.  No compartment syndrome.  No edema.  Remainder of musculoskeletal system is grossly intact.  There is no obvious deformity.  Neurological: Gross motor movement is intact in all 4 extremities.  Patient exhibits normal speech.  Skin: Skin is normal to inspection, except as documented.  Warm and dry.  No obvious rash.        ED Course   Labs Reviewed - No data to display  Patient declined Velcro wrist splint.  Patient states that he has a Velcro wrist splint at home.  MDM     Differential diagnosis prior to work-up including but not limited to fracture, strain/sprain, contusion    Radiology findings: XR WRIST COMPLETE (MIN 3 VIEWS), RIGHT (CPT=73110)    Result Date: 2/26/2025  CONCLUSION:   Small joint body at the triscaphe joint without acute fracture or dislocation.     Dictated by  (CST): Isaías Riley MD on 2/26/2025 at 5:42 PM     Finalized by (CST): Isaías Riley MD on 2/26/2025 at 5:43 PM           X-ray images of right wrist independently viewed by this provider-no acute fracture.    Physical exam remained stable as previously documented.  Available results reviewed with patient.    I have given the patient instructions regarding their diagnoses, expectations, follow up, and ER precautions. I explained to the patient that emergent conditions may arise and to go to the ER for new, worsening or any persistent conditions. I've explained the importance of following up with their doctor as instructed. The patient verbalized understanding of the discharge instructions and plan.    Disposition and Plan     Clinical Impression:  1. Right wrist pain        Disposition:  Discharge    Follow-up:  Riki Dangelo MD  [address] [place] [id number]  [phone]    Call in 1 day  For follow-up      Medications Prescribed:  Current Discharge Medication List

## 2025-02-26 NOTE — ED INITIAL ASSESSMENT (HPI)
Hyper flexed r wrist while playing pickle ball 2 weeks ago, + weakness, no numbness or tingling, good strong pulses

## (undated) DEVICE — Device: Brand: DEFENDO AIR/WATER/SUCTION AND BIOPSY VALVE

## (undated) DEVICE — ENDOSCOPY PACK - LOWER: Brand: MEDLINE INDUSTRIES, INC.

## (undated) DEVICE — SNARE ENDOSCOPIC 10MM ROUND

## (undated) DEVICE — MEDI-VAC NON-CONDUCTIVE SUCTION TUBING 6MM X 1.8M (6FT.) L: Brand: CARDINAL HEALTH

## (undated) DEVICE — Device: Brand: DUAL NARE NASAL CANNULAE FEMALE LUER CON 7FT O2 TUBE

## (undated) DEVICE — KIT CLEAN ENDOKIT 1.1OZ GOWNX2

## (undated) DEVICE — SNARE CAPTIFLEX MICRO-OVL OLY

## (undated) DEVICE — KIT ENDO ORCAPOD 160/180/190

## (undated) DEVICE — 60 ML SYRINGE REGULAR TIP: Brand: MONOJECT

## (undated) DEVICE — SNARE OPTMZ PLPCTM TRP

## (undated) DEVICE — FORCEP RADIAL JAW 4

## (undated) DEVICE — TRAP SPEC REMOVAL ETRAP 15CM

## (undated) DEVICE — CLIP RESOLUTION 235CM

## (undated) NOTE — LETTER
[address]  Authorization for Invasive Procedure                                                                                           1. I hereby authorize Brigid Velazquez MD, my physician and his/her assistants (if applicable), which may include medical students, residents, and/or fellows, to perform the following surgical operation/ procedure and administer such anesthesia as may be determined necessary by my physician: Operation/Procedure name (s) COLONOSCOPY on [address]   2. I recognize that during the surgical operation/procedure, unforeseen conditions may necessitate additional or different procedures than those listed above. I, therefore, further authorize and request that the above-named surgeon, assistants, or designees perform such procedures as are, in their judgment, necessary and desirable. 3.   My surgeon/physician has discussed prior to my surgery the potential benefits, risks and side effects of this procedure; the likelihood of achieving goals; and potential problems that might occur during recuperation. They also discussed reasonable alternatives to the procedure, including risks, benefits, and side effects related to the alternatives and risks related to not receiving this procedure. I have had all my questions answered and I acknowledge that no guarantee has been made as to the result that may be obtained. 4.   Should the need arise during my operation/procedure, which includes change of level of care prior to discharge, I also consent to the administration of blood and/or blood products. Further, I understand that despite careful testing and screening of blood or blood products by collecting agencies, I may still be subject to ill effects as a result of receiving a blood transfusion and/or blood products.   The following are some, but not all, of the potential risks that can occur: fever and allergic reactions, hemolytic reactions, transmission of diseases such as Hepatitis, AIDS and Cytomegalovirus (CMV) and fluid overload. In the event that I wish to have an autologous transfusion of my own blood, or a directed donor transfusion, I will discuss this with my physician. Check only if Refusing Blood or Blood Products  I understand refusal of blood or blood products as deemed necessary by my physician may have serious consequences to my condition to include possible death. I hereby assume responsibility for my refusal and release the hospital, its personnel, and my physicians from any responsibility for the consequences of my refusal.    o  Refuse   5. I authorize the use of any specimen, organs, tissues, body parts or foreign objects that may be removed from my body during the operation/procedure for diagnosis, research or teaching purposes and their subsequent disposal by hospital authorities. I also authorize the release of specimen test results and/or written reports to my treating physician on the hospital medical staff or other referring or consulting physicians involved in my care, at the discretion of the Pathologist or my treating physician. 6.   I consent to the photographing or videotaping of the operations or procedures to be performed, including appropriate portions of my body for medical, scientific, or educational purposes, provided my identity is not revealed by the pictures or by descriptive texts accompanying them. If the procedure has been photographed/videotaped, the surgeon will obtain the original picture, image, videotape or CD. The hospital will not be responsible for storage, release or maintenance of the picture, image, tape or CD.    7.   I consent to the presence of a  or observers in the operating room as deemed necessary by my physician or their designees.     8.   I recognize that in the event my procedure results in extended X-Ray/fluoroscopy time, I may develop a skin reaction. 9. If I have a Do Not Attempt Resuscitation (DNAR) order in place, that status will be suspended while in the operating room, procedural suite, and during the recovery period unless otherwise explicitly stated by me (or a person authorized to consent on my behalf). The surgeon or my attending physician will determine when the applicable recovery period ends for purposes of reinstating the DNAR order. 10. Patients having a sterilization procedure: I understand that if the procedure is successful the results will be permanent and it will therefore be impossible for me to inseminate, conceive, or bear children. I also understand that the procedure is intended to result in sterility, although the result has not been guaranteed. 11. I acknowledge that my physician has explained sedation/analgesia administration to me including the risk and benefits I consent to the administration of sedation/analgesia as may be necessary or desirable in the judgment of my physician. I CERTIFY THAT I HAVE READ AND FULLY UNDERSTAND THE ABOVE CONSENT TO OPERATION and/or OTHER PROCEDURE.     _________________________________________ _________________________________     ___________________________________  Signature of Patient     Signature of Responsible Person                   Printed Name of Responsible Person                              _________________________________________ ______________________________        ___________________________________  Signature of Witness         Date  Time         Relationship to Patient    STATEMENT OF PHYSICIAN My signature below affirms that prior to the time of the procedure; I have explained to the patient and/or his/her legal representative, the risks and benefits involved in the proposed treatment and any reasonable alternative to the proposed treatment.  I have also explained the risks and benefits involved in refusal of the proposed treatment and alternatives to the proposed treatment and have answered the patient's questions.  If I have a significant financial interest in a co-management agreement or a significant financial interest in any product or implant, or other significant relationship used in this procedure/surgery, I have disclosed this and had a discussion with my patient.     _______________________________________________________________ _____________________________  Ondina Lino of Physician)                                                                                         (Date)                                   (Time)  Patient Name: Craig Ocampo    : 1965   Printed: 1/10/2023      Medical Record #: [id number]                                              Page 1 of 1